# Patient Record
Sex: FEMALE | Race: WHITE | NOT HISPANIC OR LATINO | Employment: FULL TIME | URBAN - METROPOLITAN AREA
[De-identification: names, ages, dates, MRNs, and addresses within clinical notes are randomized per-mention and may not be internally consistent; named-entity substitution may affect disease eponyms.]

---

## 2022-01-08 ENCOUNTER — THERAPY VISIT (OUTPATIENT)
Dept: PHYSICAL THERAPY | Facility: CLINIC | Age: 32
End: 2022-01-08
Payer: COMMERCIAL

## 2022-01-08 DIAGNOSIS — R10.2 PELVIC PAIN IN FEMALE: ICD-10-CM

## 2022-01-08 DIAGNOSIS — M99.05 SOMATIC DYSFUNCTION OF PELVIS REGION: ICD-10-CM

## 2022-01-08 PROCEDURE — 97110 THERAPEUTIC EXERCISES: CPT | Mod: GP | Performed by: PHYSICAL THERAPIST

## 2022-01-08 PROCEDURE — 97112 NEUROMUSCULAR REEDUCATION: CPT | Mod: GP | Performed by: PHYSICAL THERAPIST

## 2022-01-08 PROCEDURE — 97161 PT EVAL LOW COMPLEX 20 MIN: CPT | Mod: GP | Performed by: PHYSICAL THERAPIST

## 2022-01-08 PROCEDURE — 97535 SELF CARE MNGMENT TRAINING: CPT | Mod: GP | Performed by: PHYSICAL THERAPIST

## 2022-01-08 NOTE — LETTER
"[facility]  [address]  [address]  [place] [facility] [address]  [phone]    2022    Re: CRAIG Khan   :   1990  MRN:  [id number]   REFERRING PHYSICIAN:   Tory GUZMAN [facility]    Date of Initial Evaluation:  22  Visits:  Rxs Used: 1  Reason for Referral:     Pelvic pain in female  Somatic dysfunction of pelvis region    EVALUATION SUMMARY    SUBJECTIVE:  Patient reports onset of symptoms ongoing for awhile.  [place] started to hurt in . IN 2021 saw MD who did a pap smear and this was really painful.  Then saw OB/GYN.  Noted extra tissue around urethra.  Then saw a uro-gyn.  Did not have concern for the \"extra tissue\".  MD felt she had increased tone in the pelvic floor.  No special tests done .Symptoms include pain with intercourse and using tampons.  .  Since onset symptoms have been getting better, worse or staying the same? Staying the same    Urination:  Do you leak on the way to the bathroom or with a strong urge to void? No    Do you leak with cough,sneeze, jumping, running?No   Any other activities that cause leaking? No   Do you have triggers that make you feel you can't wait to go to the bathroom? No     Type of pad and number used per day? NA  When you leak what is the amount? non    How long can you delay the need to urinate? 31 - 60 minutes.   How many times do you get up to urinate at night? 0    Can you stop the flow of urine when on the toilet? Yes  Is the volume of urine passed usually: large. (8sec rule=  250ml with average bladder storing  400-600ml)    Do you strain to pass urine? No  Do you have a slow or hesitant urinary stream? No  Do you have difficulty initiating the urine stream? No  Is urination painful?  No      Re: CRAIG Khan   :   1990    How many bladder infections have you had in last 12 months?non    Fluid " intake(one glass is 8oz or one cup) 6 glasses/day, 12 ozcaffinated glasses/day  Social use alcohol glasses/day.    Bowel habits:  Frequency of bowel movements? 7 times a week  Consistancy of stool? varies,  Do you ignore the urge to defecate? Yes  Do you strain to pass stool? sometimes    Pelvic Pain:  Do you have any pelvic pain with intercourse, exams, use of tampons? Yes  Is initial penetration during intercourse painful? Yes  Is deeper penetration painful? Yes  Do you use lubricant?   No      Given birth? No Any complications? Not asked  # of vaginal delieveries?0   # of C-sections?0  # of episiotomies?0.  Are you sexually active?painful so not right now  Have you ever been worried for your physical safety? No  Any abdominal or pelvic surgeries? Had a hysteroscopy for cervical polyps but did not have any removed  Are you having any regular exercise?no, tries to walk but gets leg pain with trying to walk fast, lateral leg.  Has tried different shoes. Had this 5 years ago. Has inserts and reports she has high arches.  Has a treadmill.    Have you practiced the PF(kegel) exercises for 4 or more weeks?no  Thyroid checked?has not had it checked(related to hair loss, flu-like symptoms, wt gain/loss, fatigue, menopause)  Changed diet lately?has been eating out due to a house remodel.  Leaving on vacation tomorrow.  Going to [place].  Works from home as a     OBSERVATION  Lumbar Posture: Negative knee valgus  Pelvic symmetry: Negative  Introitus: tight  Trendelenberg Sign:Negative    ROM  Single leg standing unilateral hip flexion PSIS:Not Tested: Not indicated   Standing forward flexion PSIS:Not Tested: Not indicated   Passive Hip ROM:Positive, decreased hip IR  ZABRINA:Negative  ZABRINA with OP:Not Tested: Not indicated   Posterior Sacral Shear:Not Tested: Not indicated         Re: CRAIG Khan   :   1990    MUSCEL PERFORMANCE  Active SLR:Not Tested: Not indicated   Abdominal Core 90/90 hip  lower:Negative  Baseline PF tone:hyper  PF Tone with cough: bulge  Valsalva: bulge  PF Response quality: moderate  PF Power: Center: 3 Stronger on right/left left.  Endurance: Maximum contraction in seconds: 3  # of endurance contractions before fatigue: na  Quick contraction repetitions prior to fatigue: na.  Specificity/accessory muscles: Not Tested, Synergy with abdominals: Not Tested.  Prolapse: Cyctocele/Rectocele/Uterine stgstrstastdstest:st st1st Urethrocele: none, Enterocele: none.    PALPATION: Reproduction of symptoms with digital evaluations, internal only at introitus, left greater than right and levator bilaterally    FUNCTIONAL QUESTIONNAIRES:    [id number]    Marinoff Scale:Level 2  (Level 3: Abstinence from intercourse because of severe pain. Level 2: Painful intercourse which limites frequency of activity. Level 1: Painful intercourse not severe enough to prevent activity.)    Physical Therapy Initial Evaluation  Subjective:  The history is provided by the patient. No  was used.   Patient Health History  CRAIG Khan being seen for pelvic floor PT.     Date of Onset: .   Problem occurred: unknown   Pain is reported as 0/10 on pain scale.  General health as reported by patient is good.  Pertinent medical history includes: overweight.             Other medications details: birth control, vitamins, dialy multi/MagCalcium/claritin/fishoil/Vit C.    Current occupation is .   Primary job tasks include:  Prolonged sitting.                                      Re: CRAIG Khan   :   1990       Pelvic Dysfunction Evaluation:      Diagnostic Tests:    Pelvic Exam:  X painful  UA/Urine Sample:  Negative      Flexibility:    Tightness present at:Adductors; Iliopsoas; Hamstrings and Gluteals    Abdominal Wall:  normal    Pelvic Clock Exam:    Ischiocavernosis pain:  ++  Bulbocavernosis pain:  ++  Transverse Perineal:  ++  Levator ANI:  ++  Perineal Body:  ++  SI Provocation:   NA    Reflex Testing:  normal    External Assessment:  normal    Internal Assessment:    Sensory Exam:  Normal  Contraction/Grade:  Good squeeze, good hold with lift, repeatable (4)      SEMG Biofeedback:    Equipment:  [id number]    Suraface electrode placement--Perianal:  X  Baseline EMG PM:  Elevated at 6.0uV      EMG interpretation to fatigue:  3-5 seconds  Position:  SupineAdditional History:    Number of Pregnancies: 0      Assessment/Plan:    Patient is a 31 year old female with pelvic complaints.    Patient has the following significant findings with corresponding treatment plan.                Diagnosis 1:  Pelvic pain  Pain -  manual therapy, self management, education and home program  Decreased ROM/flexibility - manual therapy, therapeutic exercise and home program  Impaired muscle performance - biofeedback, neuro re-education and home program  Decreased function - therapeutic activities and home program              Re: CRAIG Khan   :   1990    Therapy Evaluation Codes:   1) History comprised of:   Personal factors that impact the plan of care:      Time since onset of symptoms.    Comorbidity factors that impact the plan of care are:      Overweight.     Medications impacting care: None.  2) Examination of Body Systems comprised of:   Body structures and functions that impact the plan of care:      Pelvis.   Activity limitations that impact the plan of care are:      Pelvic Exam and [place].  3) Clinical presentation characteristics are:   Stable/Uncomplicated.  4) Decision-Making    Low complexity using standardized patient assessment instrument and/or measureable assessment of functional outcome.  Cumulative Therapy Evaluation is: Low complexity.    Previous and current functional limitations:  (See Goal Flow Sheet for this information)    Short term and Long term goals: (See Goal Flow Sheet for this information)     Communication ability:  Patient appears to be able to clearly  communicate and understand verbal and written communication and follow directions correctly.  Treatment Explanation - The following has been discussed with the patient:   RX ordered/plan of care  Anticipated outcomes  Possible risks and side effects  This patient would benefit from PT intervention to resume normal activities.   Rehab potential is excellent.    Frequency:  2 X a month, once daily  Duration:  for 2 months  Discharge Plan:  Achieve all LTG.  Independent in home treatment program.  Reach maximal therapeutic benefit.      Thank you for your referral.    INQUIRIES  Therapist: Apoorva Song PT  [facility] [place] [facility]  [id number] [place] [id number]  Phone: [phone]  Fax: [phone]

## 2022-01-08 NOTE — PROGRESS NOTES
"SUBJECTIVE:  Patient reports onset of symptoms ongoing for awhile.  [place] started to hurt in 2020. IN NOV. 2021 saw MD who did a pap smear and this was really painful.  Then saw OB/GYN.  Noted extra tissue around urethra.  Then saw a uro-gyn.  Did not have concern for the \"extra tissue\".  MD felt she had increased tone in the pelvic floor.  No special tests done .Symptoms include pain with intercourse and using tampons.  .  Since onset symptoms have been getting better, worse or staying the same? Staying the same    Urination:  Do you leak on the way to the bathroom or with a strong urge to void? No    Do you leak with cough,sneeze, jumping, running?No   Any other activities that cause leaking? No   Do you have triggers that make you feel you can't wait to go to the bathroom? No     Type of pad and number used per day? NA  When you leak what is the amount? non    How long can you delay the need to urinate? 31 - 60 minutes.   How many times do you get up to urinate at night? 0    Can you stop the flow of urine when on the toilet? Yes  Is the volume of urine passed usually: large. (8sec rule=  250ml with average bladder storing  400-600ml)    Do you strain to pass urine? No  Do you have a slow or hesitant urinary stream? No  Do you have difficulty initiating the urine stream? No  Is urination painful?  No    How many bladder infections have you had in last 12 months?non    Fluid intake(one glass is 8oz or one cup) 6 glasses/day, 12 ozcaffinated glasses/day  Social use alcohol glasses/day.    Bowel habits:  Frequency of bowel movements? 7 times a week  Consistancy of stool? varies,  Do you ignore the urge to defecate? Yes  Do you strain to pass stool? sometimes    Pelvic Pain:  Do you have any pelvic pain with intercourse, exams, use of tampons? Yes  Is initial penetration during intercourse painful? Yes  Is deeper penetration painful? Yes  Do you use lubricant?   No      Given birth? No Any complications? Not " asked  # of vaginal delieveries?0   # of C-sections?0  # of episiotomies?0.  Are you sexually active?painful so not right now  Have you ever been worried for your physical safety? No  Any abdominal or pelvic surgeries? Had a hysteroscopy for cervical polyps but did not have any removed  Are you having any regular exercise?no, tries to walk but gets leg pain with trying to walk fast, lateral leg.  Has tried different shoes. Had this 5 years ago. Has inserts and reports she has high arches.  Has a treadmill.    Have you practiced the PF(kegel) exercises for 4 or more weeks?no  Thyroid checked?has not had it checked(related to hair loss, flu-like symptoms, wt gain/loss, fatigue, menopause)  Changed diet lately?has been eating out due to a house remodel.  Leaving on vacation tomorrow.  Going to [place].  Works from home as a     OBSERVATION  Lumbar Posture: Negative knee valgus  Pelvic symmetry: Negative  Introitus: tight  Trendelenberg Sign:Negative    ROM  Single leg standing unilateral hip flexion PSIS:Not Tested: Not indicated   Standing forward flexion PSIS:Not Tested: Not indicated   Passive Hip ROM:Positive, decreased hip IR  ZABRINA:Negative  ZABRINA with OP:Not Tested: Not indicated   Posterior Sacral Shear:Not Tested: Not indicated     MUSCEL PERFORMANCE  Active SLR:Not Tested: Not indicated   Abdominal Core 90/90 hip lower:Negative  Baseline PF tone:hyper  PF Tone with cough: bulge  Valsalva: bulge  PF Response quality: moderate  PF Power: Center: 3 Stronger on right/left left.  Endurance: Maximum contraction in seconds: 3  # of endurance contractions before fatigue: na  Quick contraction repetitions prior to fatigue: na.  Specificity/accessory muscles: Not Tested, Synergy with abdominals: Not Tested.  Prolapse: Cyctocele/Rectocele/Uterine stgstrstastdstest:st st1st Urethrocele: none, Enterocele: none.    PALPATION: Reproduction of symptoms with digital evaluations, internal only at introitus, left greater than  right and levator bilaterally    FUNCTIONAL QUESTIONNAIRES:    [id number]    Marinoff Scale:Level 2  (Level 3: Abstinence from intercourse because of severe pain. Level 2: Painful intercourse which limites frequency of activity. Level 1: Painful intercourse not severe enough to prevent activity.)    Physical Therapy Initial Evaluation  Subjective:  The history is provided by the patient. No  was used.   Patient Health History  CRAIG Khan being seen for pelvic floor PT.     Date of Onset: 2020.   Problem occurred: unknown   Pain is reported as 0/10 on pain scale.  General health as reported by patient is good.  Pertinent medical history includes: overweight.             Other medications details: birth control, vitamins, dialy multi/MagCalcium/claritin/fishoil/Vit C.    Current occupation is .   Primary job tasks include:  Prolonged sitting.                                    Objective:  System                                 Pelvic Dysfunction Evaluation:      Diagnostic Tests:    Pelvic Exam:  X painful  UA/Urine Sample:  Negative                    Flexibility:    Tightness present at:Adductors; Iliopsoas; Hamstrings and Gluteals    Abdominal Wall:  normal        Pelvic Clock Exam:    Ischiocavernosis pain:  ++  Bulbocavernosis pain:  ++  Transverse Perineal:  ++  Levator ANI:  ++  Perineal Body:  ++  SI Provocation:  NA        Reflex Testing:  normal    External Assessment:  normal              Internal Assessment:    Sensory Exam:  Normal  Contraction/Grade:  Good squeeze, good hold with lift, repeatable (4)          SEMG Biofeedback:    Equipment:  Talkpush electrode placement--Perianal:  X  Baseline EMG PM:  Elevated at 6.0uV        EMG interpretation to fatigue:  3-5 seconds  Position:  SupineAdditional History:    Number of Pregnancies: 0                         General     ROS    Assessment/Plan:    Patient is a 31 year old female with pelvic complaints.     Patient has the following significant findings with corresponding treatment plan.                Diagnosis 1:  Pelvic pain  Pain -  manual therapy, self management, education and home program  Decreased ROM/flexibility - manual therapy, therapeutic exercise and home program  Impaired muscle performance - biofeedback, neuro re-education and home program  Decreased function - therapeutic activities and home program    Therapy Evaluation Codes:   1) History comprised of:   Personal factors that impact the plan of care:      Time since onset of symptoms.    Comorbidity factors that impact the plan of care are:      Overweight.     Medications impacting care: None.  2) Examination of Body Systems comprised of:   Body structures and functions that impact the plan of care:      Pelvis.   Activity limitations that impact the plan of care are:      Pelvic Exam and [place].  3) Clinical presentation characteristics are:   Stable/Uncomplicated.  4) Decision-Making    Low complexity using standardized patient assessment instrument and/or measureable assessment of functional outcome.  Cumulative Therapy Evaluation is: Low complexity.    Previous and current functional limitations:  (See Goal Flow Sheet for this information)    Short term and Long term goals: (See Goal Flow Sheet for this information)     Communication ability:  Patient appears to be able to clearly communicate and understand verbal and written communication and follow directions correctly.  Treatment Explanation - The following has been discussed with the patient:   RX ordered/plan of care  Anticipated outcomes  Possible risks and side effects  This patient would benefit from PT intervention to resume normal activities.   Rehab potential is excellent.    Frequency:  2 X a month, once daily  Duration:  for 2 months  Discharge Plan:  Achieve all LTG.  Independent in home treatment program.  Reach maximal therapeutic benefit.    Please refer to the daily  flowsheet for treatment today, total treatment time and time spent performing 1:1 timed codes.

## 2022-01-20 ENCOUNTER — THERAPY VISIT (OUTPATIENT)
Dept: PHYSICAL THERAPY | Facility: CLINIC | Age: 32
End: 2022-01-20
Payer: COMMERCIAL

## 2022-01-20 DIAGNOSIS — R10.2 PELVIC PAIN IN FEMALE: ICD-10-CM

## 2022-01-20 DIAGNOSIS — M99.05 SOMATIC DYSFUNCTION OF PELVIS REGION: ICD-10-CM

## 2022-01-20 PROCEDURE — 97112 NEUROMUSCULAR REEDUCATION: CPT | Mod: GP | Performed by: PHYSICAL THERAPIST

## 2022-01-20 PROCEDURE — 97535 SELF CARE MNGMENT TRAINING: CPT | Mod: GP | Performed by: PHYSICAL THERAPIST

## 2022-01-20 PROCEDURE — 97140 MANUAL THERAPY 1/> REGIONS: CPT | Mod: GP | Performed by: PHYSICAL THERAPIST

## 2022-02-06 ENCOUNTER — HEALTH MAINTENANCE LETTER (OUTPATIENT)
Age: 32
End: 2022-02-06

## 2022-02-10 ENCOUNTER — THERAPY VISIT (OUTPATIENT)
Dept: PHYSICAL THERAPY | Facility: CLINIC | Age: 32
End: 2022-02-10
Payer: COMMERCIAL

## 2022-02-10 DIAGNOSIS — R10.2 PELVIC PAIN IN FEMALE: ICD-10-CM

## 2022-02-10 DIAGNOSIS — M99.05 SOMATIC DYSFUNCTION OF PELVIS REGION: ICD-10-CM

## 2022-02-10 PROCEDURE — 97140 MANUAL THERAPY 1/> REGIONS: CPT | Mod: GP | Performed by: PHYSICAL THERAPIST

## 2022-02-10 PROCEDURE — 97535 SELF CARE MNGMENT TRAINING: CPT | Mod: GP | Performed by: PHYSICAL THERAPIST

## 2022-02-24 ENCOUNTER — THERAPY VISIT (OUTPATIENT)
Dept: PHYSICAL THERAPY | Facility: CLINIC | Age: 32
End: 2022-02-24
Payer: COMMERCIAL

## 2022-02-24 DIAGNOSIS — R10.2 PELVIC PAIN IN FEMALE: ICD-10-CM

## 2022-02-24 DIAGNOSIS — M99.05 SOMATIC DYSFUNCTION OF PELVIS REGION: ICD-10-CM

## 2022-02-24 PROCEDURE — 97110 THERAPEUTIC EXERCISES: CPT | Mod: GP | Performed by: PHYSICAL THERAPIST

## 2022-02-24 PROCEDURE — 97140 MANUAL THERAPY 1/> REGIONS: CPT | Mod: GP | Performed by: PHYSICAL THERAPIST

## 2022-02-24 PROCEDURE — 97535 SELF CARE MNGMENT TRAINING: CPT | Mod: GP | Performed by: PHYSICAL THERAPIST

## 2022-06-02 PROBLEM — M99.05 SOMATIC DYSFUNCTION OF PELVIS REGION: Status: RESOLVED | Noted: 2022-01-08 | Resolved: 2022-06-02

## 2022-06-02 PROBLEM — R10.2 PELVIC PAIN IN FEMALE: Status: RESOLVED | Noted: 2022-01-08 | Resolved: 2022-06-02

## 2022-06-02 NOTE — PROGRESS NOTES
Subjective:  HPI  Physical Exam                    Objective:  System    Physical Exam    General     ROS    Assessment/Plan:    DISCHARGE REPORT    Progress reporting period is from 02/24/22.       SUBJECTIVE  Subjective changes noted by patient:    Subjective: Dilators going ok, went up to 4 or 5. Wasn't painful but uncomfortable and no soreness afterwards. Also has been walking more on treadmill. Did have increased L calf pain with running up stairs so did yoga instead of walking. Feeling better today. Overall less B lateral lower leg sorreness.     Current pain level is NA  .     Previous pain level was  NA  .   Changes in function:  Yes (See Goal flowsheet attached for changes in current functional level)  Adverse reaction to treatment or activity: None    OBJECTIVE  Changes noted in objective findings:  Patient has failed to return to therapy so current objective findings are unknown.  Objective: Added clamshells today. PF tension ~70-80%  of WNL tension.      ASSESSMENT/PLAN  Updated problem list and treatment plan:   STG/LTGs have been met or progress has been made towards goals:  Yes (See Goal flow sheet completed today.)  Assessment of Progress: The patient has not returned to therapy. Current status is unknown.  Self Management Plans:  Patient has been instructed in a home treatment program.  Patient  has been instructed in self management of symptoms.    CRAIG continues to require the following intervention to meet STG and LTG's:  PT intervention is no longer required to meet STG/LTG.    Recommendations:  This patient is ready to be discharged from therapy and continue their home treatment program.    Please refer to the daily flowsheet for treatment today, total treatment time and time spent performing 1:1 timed codes.

## 2022-10-03 ENCOUNTER — HEALTH MAINTENANCE LETTER (OUTPATIENT)
Age: 32
End: 2022-10-03

## 2023-02-12 ENCOUNTER — HEALTH MAINTENANCE LETTER (OUTPATIENT)
Age: 33
End: 2023-02-12

## 2024-03-09 ENCOUNTER — HEALTH MAINTENANCE LETTER (OUTPATIENT)
Age: 34
End: 2024-03-09